# Patient Record
Sex: MALE | Race: WHITE
[De-identification: names, ages, dates, MRNs, and addresses within clinical notes are randomized per-mention and may not be internally consistent; named-entity substitution may affect disease eponyms.]

---

## 2020-11-18 ENCOUNTER — HOSPITAL ENCOUNTER (OUTPATIENT)
Dept: HOSPITAL 18 - NAV RAD | Age: 75
Discharge: HOME | End: 2020-11-18
Payer: MEDICARE

## 2020-11-18 DIAGNOSIS — I48.91: Primary | ICD-10-CM

## 2020-11-18 PROCEDURE — 71046 X-RAY EXAM CHEST 2 VIEWS: CPT

## 2020-11-18 NOTE — RAD
EXAM:

Chest PA and lateral:



HISTORY:

Atrial fibrillation. 



COMPARISON:

3/31/2020



FINDINGS:



Heart: Cardiomegaly.

Aorta: Unremarkable

Pulmonary vessels: Normal

Costophrenic angles: Costophrenic angles are clear. 

Lungs: No consolidation or masses. Chronic lung parenchymal changes.

Pneumothorax: No pneumothorax

Osseous structures: No osseous abnormalities

IMPRESSION:

No acute cardiopulmonary process. 







Reported By: Mera Carson 

Electronically Signed:  11/18/2020 11:40 AM